# Patient Record
Sex: MALE | Race: WHITE | NOT HISPANIC OR LATINO | Employment: OTHER
[De-identification: names, ages, dates, MRNs, and addresses within clinical notes are randomized per-mention and may not be internally consistent; named-entity substitution may affect disease eponyms.]

---

## 2023-01-01 ENCOUNTER — TRANSCRIBE ORDERS (OUTPATIENT)
Dept: OTHER | Age: 63
End: 2023-01-01

## 2023-01-01 ENCOUNTER — HEALTH MAINTENANCE LETTER (OUTPATIENT)
Age: 63
End: 2023-01-01

## 2023-01-01 ENCOUNTER — PATIENT OUTREACH (OUTPATIENT)
Dept: ONCOLOGY | Facility: CLINIC | Age: 63
End: 2023-01-01
Payer: COMMERCIAL

## 2023-01-01 ENCOUNTER — LAB REQUISITION (OUTPATIENT)
Dept: LAB | Facility: CLINIC | Age: 63
End: 2023-01-01
Payer: COMMERCIAL

## 2023-01-01 ENCOUNTER — PRE VISIT (OUTPATIENT)
Dept: ONCOLOGY | Facility: CLINIC | Age: 63
End: 2023-01-01

## 2023-01-01 ENCOUNTER — ONCOLOGY VISIT (OUTPATIENT)
Dept: ONCOLOGY | Facility: CLINIC | Age: 63
End: 2023-01-01
Attending: INTERNAL MEDICINE
Payer: COMMERCIAL

## 2023-01-01 VITALS
HEIGHT: 72 IN | BODY MASS INDEX: 34.65 KG/M2 | RESPIRATION RATE: 16 BRPM | OXYGEN SATURATION: 100 % | HEART RATE: 64 BPM | TEMPERATURE: 97.8 F | SYSTOLIC BLOOD PRESSURE: 110 MMHG | WEIGHT: 255.8 LBS | DIASTOLIC BLOOD PRESSURE: 70 MMHG

## 2023-01-01 DIAGNOSIS — C61 PROSTATE CANCER (H): Primary | ICD-10-CM

## 2023-01-01 DIAGNOSIS — C61 PROSTATE CANCER (H): ICD-10-CM

## 2023-01-01 LAB
PATH REPORT.COMMENTS IMP SPEC: ABNORMAL
PATH REPORT.COMMENTS IMP SPEC: YES
PATH REPORT.COMMENTS IMP SPEC: YES
PATH REPORT.FINAL DX SPEC: ABNORMAL
PATH REPORT.FINAL DX SPEC: ABNORMAL
PATH REPORT.GROSS SPEC: ABNORMAL
PATH REPORT.GROSS SPEC: ABNORMAL
PATH REPORT.MICROSCOPIC SPEC OTHER STN: ABNORMAL
PATH REPORT.RELEVANT HX SPEC: ABNORMAL
PATH REPORT.SITE OF ORIGIN SPEC: ABNORMAL
PATH REPORT.SITE OF ORIGIN SPEC: ABNORMAL

## 2023-01-01 PROCEDURE — 88321 CONSLTJ&REPRT SLD PREP ELSWR: CPT | Performed by: PATHOLOGY

## 2023-01-01 PROCEDURE — 99213 OFFICE O/P EST LOW 20 MIN: CPT | Performed by: STUDENT IN AN ORGANIZED HEALTH CARE EDUCATION/TRAINING PROGRAM

## 2023-01-01 PROCEDURE — 99417 PROLNG OP E/M EACH 15 MIN: CPT | Performed by: STUDENT IN AN ORGANIZED HEALTH CARE EDUCATION/TRAINING PROGRAM

## 2023-01-01 PROCEDURE — 99205 OFFICE O/P NEW HI 60 MIN: CPT | Performed by: STUDENT IN AN ORGANIZED HEALTH CARE EDUCATION/TRAINING PROGRAM

## 2023-01-01 RX ORDER — OXYCODONE HYDROCHLORIDE 5 MG/1
1 TABLET ORAL EVERY 4 HOURS PRN
COMMUNITY
Start: 2023-01-01

## 2023-01-01 RX ORDER — PREGABALIN 200 MG/1
200 CAPSULE ORAL
COMMUNITY
Start: 2021-01-20

## 2023-01-01 RX ORDER — ACETAMINOPHEN 325 MG/1
650 TABLET ORAL
COMMUNITY
Start: 2022-07-08

## 2023-01-01 RX ORDER — SENNOSIDES A AND B 8.6 MG/1
TABLET, FILM COATED ORAL DAILY PRN
COMMUNITY
Start: 2020-01-20

## 2023-01-01 RX ORDER — HYDROCODONE BITARTRATE AND ACETAMINOPHEN 5; 325 MG/1; MG/1
1-2 TABLET ORAL
COMMUNITY
Start: 2021-12-13

## 2023-01-01 RX ORDER — PRUCALOPRIDE 1 MG/1
2 TABLET, FILM COATED ORAL
COMMUNITY
Start: 2022-01-20

## 2023-01-01 RX ORDER — LANOLIN ALCOHOL/MO/W.PET/CERES
1000 CREAM (GRAM) TOPICAL DAILY
COMMUNITY

## 2023-01-01 ASSESSMENT — PAIN SCALES - GENERAL: PAINLEVEL: NO PAIN (0)

## 2023-11-20 NOTE — PROGRESS NOTES
New Patient Oncology Nurse Navigator Note     Referring provider:   Referred by Tish Jones Daniel S, M.D, requesting Sasha     Referred to (specialty): Medical Oncology    Requested provider (if applicable):   Dr. Baez     Date Referral Received:   11/20/23     Evaluation for :   Prostate cancer  [id number] at CD3-PSMA BITE  ELISABETH 280 cd3/B7H3    Clinical History (per Nurse review of records provided):    Per Dr. Winston note:  Metastatic castration resistant prostate cancer  Mr. Prasad is a 63-year-old with bone-predominant, metastatic castration resistant prostate cancer. Regarding systemic therapies, he has received abiraterone/prednisone, sipuleucel-T, enzalutamide, [id number] (TIGIT receptor antibody) plus pembrolizumab, FOR46 (MMAE ADC to CD46), radium-223, cabazitaxel (chosen over docetaxel given CIPN and desire to minimize hair loss), Pluvicto (6 cycles completed in April 2023), and PT-112-101 (phosplatin clinical trial).         Records Location (Care Everywhere, Media, etc.):   [place]      Dr. Winston had reached out to Dr. Baez via email about possible trial options.  Dr. Baez is willing to add on to see this patient for the following dates/time.  11/27 @ 830AM    I called in attempt to reach patient to discuss.  There was no answer so I LM for him to return my call.    11/21/23  I had not heard back from Paul, so I called again to discuss.  I introduced my role and reviewed what this consult visit will entail/what to expect.  I reviewed the location and gave contact numbers including new patient scheduling and clinic phone numbers.   Paul, has no other questions at this time.    I forwarded on referral with scheduling instructions for the following     PLAN:   1) Dr. Baez @ [facility],11/27 830- 930 AM, NEW, in person  2) if patient is scheduled with Dr. Neno Baez @ [facility], then also schedule for a lab draw following the consultation    I warm transferred call to our new patient scheduling to finalize  appointment.    Zita Quach, RN, BSN  Oncology New Patient Nurse Navigator   [facility]  [phone]

## 2023-11-22 NOTE — TELEPHONE ENCOUNTER
Action 2023 8:37 AM ABT   Action Taken Called [facility] james for them to push images to PACS     RECORDS STATUS - ALL OTHER DIAGNOSIS      RECORDS RECEIVED FROM: [facility]   DATE RECEIVED:    NOTES STATUS DETAILS   OFFICE NOTE from referring provider [facility] 23: Dr. Doc Winston   OFFICE NOTE from medical oncologist [facility]:  23: Dr. Doc Winston    [facility]:   OFFICE NOTE from other specialist [facility] Rad Onc:  10/20/23: Dr. Yolis Radford   DISCHARGE SUMMARY from hospital [facility] 16: [facility]   OPERATIVE REPORT [facility] 17: ENDO ADULT COLONOSCOPY WITH BIOPSY     16: Robotic prostatectomy with lymph node dissection    MEDICATION LIST [facility]    LABS     PATHOLOGY REPORTS Re [facility]    [facility] [employer] [facility]  [facility] [facility]:  23: [id number]    [facility]:  11/25/15: [id number]   ANYTHING RELATED TO DIAGNOSIS [facility] Most recent 11/15/23    PSA: 11/15/23   GENONOMIC TESTING     TYPE: External: Guardant 360 06/10/23    IMAGING (NEED IMAGES & REPORT)  [facility] [employer] [facility]   CT SCANS Re [facility]:  23-22: CT Chest  23-22: CT Abd Pel    [facility]:  22-12/07/15: CT Abd Pel  22-17: CT Chest   MRI Req [facility]:  22-22: MR T-Spine    [facility]:  22-21: MR T-Spine  21, 20: MR Pelvis  16: MR Prostate   XRAYS/NM Req [facility]:  23-22: NM Bone Scan    [facility]:  22-17: NM Whole Body  10/14/21: XR Whole Body  16, 16: XR Cystogram   ULTRASOUND Req -[facility]:  040/, 12/23/15: US Transrectal   PET Req -[facility]:  23, 22: PET CT Skull PSMA    [facility]:  21, 20: PET PSMA  19-18: PET CT Axium

## 2023-11-25 NOTE — PROGRESS NOTES
[facility] Medical Oncology Second Opinion Consultation Note       Date of visit: November 27, 2023    Dear Dr. Doc Winston, thank you for referring your patient for a Second Opinion consultation at the [facility].  A brief overview of his oncological history as well as my recommendations follow below.    CC: Clinical trial availability for [id number] for mCPRC.      HPI: Feeling OK today.  Appetite is good.  Not significantly limited at this time in activity.  Fatigued, but able to do what he wants to do.  No nausea at this time.  Walks with cane.  Interested in clinical trials here at [facility].  Also seeing team in [place] for possible RLT trials.      ONCOLOGY HISTORY (Adapted from Dr. Winston' history at [facility]):    Primary Malignant Neoplasm Of Prostate (HCC)  2015 Initial Diagnosis  Mr. Prasad was diagnosed with prostate cancer in 2015. He presented with urinary obstruction, PSA 43, Ralph 4+5 prostate cancer on TRUS biopsy.    11/2015: Bone scan + CT A/P with non-specific uptake of L 11th rib, no definitive evidence of metastatic disease.    1/14/2016 Surgery and Procedures  Radical prostatectomy, pathology pT4N1 (1/22 lymph nodes positive), Ralph 4+3 (tertiary 5), positive margins.    Post prostatectomy PSA (2/22/2016): PSA 0.617    5/12/2016 - Radiation Therapy  3/23/2016: Combidex scan with up to 32 suspicious lymph nodes up to 1 cm below renal artery; no evidence of suspicious bone lesions.    3/28/2016: Start ADT    5/12/16: Salvage radiation starts (details unavailable)    12/2016: PSA < 0.015    1/2018 - Chemotherapy (presume docetaxel)  1/9/2018: PSA 2.173. Auxumin PET with uptake L 10th rib, T8.    1/29/2018 - 2/26/2018: Sipuleucel-T (Provenge) x 3 doses    1/30/2018: Cancer Risk Genetics - germline panel with VUS in APC, no other mutations identified    3/2018 - Radiation Therapy  3/2018: SBRT to T8 and L 10th rib    9/2018 - 6/10/2019  Chemotherapy  8/24/2018: PSA 8.8    8/29/2018: Axumin PET with new uptake T5, increased size and tracer uptake at L lateral 10th rib    9/10/2018: Started abiraterone/prednisone    10/11-10/12/18: SBRT to T5 lesion    4/16/2019: PSA 3.679. Axumin-PET/CT showed no evidence of progression.    6/7/2019: PSA 7.289    6/10/2019: Discontinued abiraterone, tapered prednisone (appears to have been transitioned to enzalutamide in the setting of biochemical progression)    7/2/2019 - 12/10/2019 Chemotherapy  7/2/2019: Started enzalutamide    11/25/2019: CT CAP, stable osseous lesions in the spine and 10th left rib. Increase in size of metastases within the posterior lateral left 10th rib and partially visualized sclerotic lesion within the T8 vertebral body.    11/26/2019: NM bone scan, stable osseous metastatic disease within the posterolateral left 10th rib, T5 and T8 vertebral bodies.    12/9/2019: PSA 4.313    12/10/2019: Stop enzalutamide for radiographic progression.    1/16/2020 - 5/14/2020 Chemotherapy  1/16/2020: [id number] + Pembrolizumab    6 cycles completed, osseous and biochemical progression    6/10/2020 - 8/30/2021 Chemotherapy  6/10/20: A Phase 1 Study of FOR46 (vc-MMAE antibody-drug conjugate that recognizes a tumor-selective epitope of Cluster of differentiation 46 (Cd46))    After 21 cycles, discontinued for radiographic progression in the thoracic spine (epidural extension) and L sacrum.    Course was complicated by CIPN.    9/23/2021 - Chemotherapy  9/23/21: Start treatment with Xofigo (radium 223). Completed 6th and final treatment on 2/10/2022.    11/8/2021 - Radiation Therapy  11/8/2021: SBRT T8, T9, T12, L 10th rib (unable to treat T5 due to proximity to spinal cord)    3/21/2022 Progression/Relapse  Progression of osseous metastases.  PSA 16.8    4/14/2022 - 6/21/2022 Chemotherapy  Start cabazitaxel, which was selected over docetaxel given the patients neuropathy and desire to minimize hair  loss.    Completed 4 cycles of treatment.    PSA increased to 32.5    Progressive skeletal metastases noted 7/22/2022. His miPSMA score is 3. In addition, MR showed progressive disease throughout the thoracic spine.    He was referred for palliative RT (T10, T4, T5) prior to planned initiation of Pluvicto, which was temporarily unavailable owing to supply chain and production issues.    Cabazitaxel  Start Date: 4/14/2022 8/11/2022 - 8/19/2022 Radiation Therapy  Radiation Therapy Treatment Details (8/17/2022 - 8/19/2022)  Site: Spine - Thoracic  Technique: SBRT  Goal: Palliative  Planned Treatment Start Date: 8/11/2022 9/9/2022 Other  Radioligand Therapy  Pluvicto:  Cycle 1: 9/09/2022  Cycle 2: 10/21/2022  Cycle 3: 12/01/2022  Cycle 4: 1/20/2023  Cycle 5: 3/07/2023  Cycle 6: 4/18/2023    PSA at the start of treatment was 25.4 ng/mL. His dalia was 1.4 ng/mL prior to cycle 5. By the end of treatment, PSA had increased to 3.9 ng/mL. Posttreatment scans demonstrate no clear evidence of visceral or mao metastases. Bone scan demonstrates uptake in multiple new right posterior ribs (in the absence of trauma, though CT demonstrates rib fractures) and increasing radiotracer activity in the thoracic spine.      7/26/2023 - Research Study Participant  Research Study: A Phase 1 Study Evaluating the Safety, PK, and Clinical Effects of PT-112 in Subjects With Advanced Solid Tumors ([id number])  Treatment Protocol: [facility] PT-112-101 Dose Expansion Cohort D Part 2 Arm 2 ( PT-112 ).  (Ribosomal biogenesis inhibitor, small molecule from [employer]).      9/20/2023 Critical Imaging   Bone scan demonstrates slight increase in uptake sacral metastasis with no new sites of metastatic disease. CT chest, abdomen, and pelvis were stable.    10/18/2023 - 10/20/2023 Radiation Therapy   Proceeded with palliative radiotherapy to left rib and sacrum. Continued clinical trial.    Radiation Therapy Treatment Details (10/18/2023 -  10/20/2023)  Sites: Left Rib, Sacrum  Technique: SBRT  Goal: Palliative  Planned Treatment Start Date: 10/18/2023    11/15/2023 Other   PSA rises to 64.5 ng/mL  Alkaline phosphatase 108    Restaging scans (conventional imaging) demonstrate progression in the liver with at least 8 new metastases, including multiple lesions over a cm in size. He has stable disease in the bones.     11/27/23-Clinical trial second opinion at [facility]. Provided with ELISABETH-280 and [id number] consents to review.  No slots until at least the end of January most likely.  I have also reached out to Dr. Kashmir Dickson at [facility] regarding availability for AMG-509.      GENOMIC STUDIES:   [facility]C VUS germline panel noted in 2018.    Guardant 360 6/8/23-      TREATMENT HISTORY:   RP  Salvage RT  Docetaxel (CSPC and CRPC)  Enzalutamide   Abiraterone  Sip-T  Cabazitaxel   FOR46 (vc-MMAE antibody-drug conjugate that recognizes a tumor-selective epitope of Cluster of differentiation 46 (Cd46)) (Phase 1)  [facility] PT-112-101 Dose Expansion Cohort D Part 2 Arm 2 ( PT-112 ).  (Ribosomal biogenesis inhibitor, small molecule from [employer]).    SBRT to multiple sites   [id number] + Pembrolizumab  Pluvicto x6    GUIDELINES USED:  NCCN (clinical trial)    INTENT OF THERAPY: Palliative, discussed at 11/27/23 visit.      CLINICAL TRIALS:  Amelie CD3-PSMA, ELISABETH-280    PMH:  mCRPC  Supraventricular tachycardia   Chronic anticoagulation   Neuropathy secondary to chemotherapy  Pulmonary embolism history (dx 8/2021)     PSH:  RP     FAMILY HX:  Mother, colon cancer, breast cancer, ovarian cancer   Brother, testicular cancer     SOCIAL:  Never smoker  Never smokeless tobacco user   Rare EtOH  No recreational drugs.     ALLERGIES: Bee venom    MEDS:  Lupron  Oxycodone  Senna  Pregabalin  Metoprolol (toprol XL).    Eliquus 2.5mg BID (ppx dose) for hx of PE in 2021  Prucalopride (motegrity) for chronic constipation     ROS-Remainder of 14 point ROS reviewed and  "negative except as in HPI.    PHYSICAL EXAM:  ECOG-PS=1    /70 (BP Location: Right arm, Patient Position: Sitting, Cuff Size: Adult Large)   Pulse 64   Temp 97.8  F (36.6  C) (Oral)   Resp 16   Ht 1.82 m (5' 11.65\")   Wt 116 kg (255 lb 12.8 oz)   SpO2 100%   BMI 35.03 kg/m    Exam:  Constitutional: healthy, alert, and no distress  Head: Normocephalic. No masses, lesions, tenderness or abnormalities, anicteric sclerae.   Neck: Neck supple. No adenopathy. Thyroid symmetric, normal size,  ENT: ENT exam normal, no neck nodes or sinus tenderness..   Cardiovascular: negative, No edema or JVD.  Respiratory: negative, normal WOB on RA, no wheezing or rhonchi audible.   Gastrointestinal: negative, non-distended, non-tender.   : Deferred  Musculoskeletal: extremities normal- no gross deformities noted and normal muscle tone, walking with cane.   Skin: no suspicious lesions or rashes on exposed areas of skin.   Neurologic: walks with cane. CNII-XII grossly intact, normal speech and mentation.   Psychiatric: mentation appears normal and affect normal/bright  Hematologic/Lymphatic/Immunologic: Normal cervical lymph nodes    LABS AND IMAGES:    Labs from [facility] on 11/15/23 reviewed.    WBC 5.7, Hgb 9.4, plt 90  Creatinine 1.00 GFR 85.      PSA trend-see oncology history.      PSMA PET scan from [facility] 11/16/23  Mixed response to Pluvicto with some areas of response and other areas of progression.  Multiple liver lesions present.      IMPRESSION AND PLAN:    # metastatic, castration-resistant prostate cancer.      Paul Prasad is a 63-year-old man with an extensive history of therapy for metastatic castration resistant prostate cancer.  He was initially diagnosed in 2015 with [place] 4+5 = 9 adenocarcinoma that was thought to be localized.  He underwent radical prostatectomy and subsequent salvage radiation therapy.  Over the course of the last several years he has received multiple lines of therapy including " docetaxel, cabazitaxel, enzalutamide, abiraterone, Sipuleucel-T, radium-223, Pluvicto, and 2 phase 1 clinical trial regimens.  He has had multiple treatments with SBRT as well for metastatic sites.  At our initial appointment on 11/27/2023 Paul and I discussed the basics of immune engager therapy with the Amelie PSMA-CD3 and GHA776 trials.  We discussed these are phase 1 trials with a goal of determining safety and maximum tolerated dose.  At this moment we are in the dose escalation phase and there are 1-2 step up doses required for each of these compounds.  These required between 24 and 48 hours of hospitalization to monitor for cytokine release syndrome and neurotoxicity.  Both medications are given as subcutaneous injection once a week during step up and then either every 3 weeks for Mesa or weekly 4 to IVW908 once the top dose is achieved and the step up.  We talked about major risks with this type of therapy including cytokine release syndrome, neurotoxicity, neuropathy, endorgan damage which could be fatal and severe injury or death.  I did note that Jaz is a little bit further in the step up and more patients have been treated worldwide.  He would be one of the first and [place] to be treated with RUZ951.  He is also seeking clinical trial eligibility with radioligand therapy in [place] and possibly being referred to [facility] in [place] for  as well.  I have separately reached out to Kashmir Dickson and his team at [facility] regarding  slots there.    Unfortunately we do not have many slight openings at the moment and her next slot is not until January.  His PSA has been rising and I agree with proceeding with the first trial that has a treatment availability for him.  I discussed that we would add him to the list for both Mesa and XRS348 clinical trials and we would call if slot is available for him.  I did also discuss that his labs are very close to the edge but he still does  qualify.  The biggest issue we have had with these trials is a hemoglobin of 9.0 or higher in these heavily pretreated populations.  He is scheduled to be evaluated in [place] for this radioligand therapy on Wednesday 11/29/2023.  His performance status is otherwise adequate at ECOG 1 to be eligible for these trials.  We will follow-up as needed if there is Trasylol that is available and he has not already enrolled on another trial.  I did provide him with copies of both clinical trial consents for him to review in the meantime.    PLAN:  We have added to the list for possible candidates for [id number] and [id number].    Consents for ROG630 and [id number] provided as printouts.   I have reached out to Dr. Kashmir Dickson at [place] regarding GDB279 slot availability.   Return with me if we have a slot opening and he has not found another trial in the interval.      A total of 80 minutes were spent on this patient on the day of the encounter, of which more than 50% of this time was used for counseling and coordination of care.  The patient was given the opportunity to ask multiple questions today, all of which were answered to their satisfaction.    Neno Baez MD, PhD   of Medicine   Oncology/BMT/Cellular Therapies

## 2023-11-27 NOTE — NURSING NOTE
"Oncology Rooming Note    November 27, 2023 8:30 AM   Paul Prasad is a 63 year old male who presents for:    Chief Complaint   Patient presents with    Oncology Clinic Visit     Prostate cancer     Initial Vitals: /70 (BP Location: Right arm, Patient Position: Sitting, Cuff Size: Adult Large)   Pulse 64   Temp 97.8  F (36.6  C) (Oral)   Resp 16   Ht 1.82 m (5' 11.65\")   Wt 116 kg (255 lb 12.8 oz)   SpO2 100%   BMI 35.03 kg/m   Estimated body mass index is 35.03 kg/m  as calculated from the following:    Height as of this encounter: 1.82 m (5' 11.65\").    Weight as of this encounter: 116 kg (255 lb 12.8 oz). Body surface area is 2.42 meters squared.  No Pain (0) Comment: Data Unavailable   No LMP for male patient.  Allergies reviewed: Yes  Medications reviewed: Yes    Medications: Medication refills not needed today.  Pharmacy name entered into [employer]: [id number] - [place] - [address] AT [facility] [address] [address]    Clinical concerns: none       Deepika Castaneda              "

## 2023-11-30 NOTE — TELEPHONE ENCOUNTER
Action 11/30/23@1054 CDK   Action Taken SLIDES FROM [facility] CASE: 01/14/23: [id number]  12/09/15: [id number] TAKEN TO 5TH FLOOR TO BE PROCESSED  CK

## 2023-11-30 NOTE — TELEPHONE ENCOUNTER
Action November 30, 2023 12:28 PM SY   Action Taken Slides from [facility] received and taken to 5th floor path lab for review.  11/25/15: [id number] (6 slides)

## 2024-01-01 ENCOUNTER — DOCUMENTATION ONLY (OUTPATIENT)
Dept: ONCOLOGY | Facility: CLINIC | Age: 64
End: 2024-01-01
Payer: COMMERCIAL

## 2024-01-01 ENCOUNTER — TELEPHONE (OUTPATIENT)
Dept: ONCOLOGY | Facility: CLINIC | Age: 64
End: 2024-01-01
Payer: COMMERCIAL

## 2024-01-10 NOTE — NURSING NOTE
Emailed patient the consent form for 2022IS109. Instructed patient to only review the form and not to sign it. Will follow up with patient on Friday to discuss trial and answer any questions he has. Unclear if a slot is available at this time, waiting for the sponsor to get back to us.     Jenny Robin RN   Clinical Research Coordinator Nurse-Solid Tumor   Phone: [phone] Pgr: [phone]

## 2024-01-12 NOTE — TELEPHONE ENCOUNTER
Called patient to let him know that unfortunately, he is not eligible for the JNJ T Cell trial due to his past epidural extension of his cancer in his spine. Patient understanding and is planning on starting chemotherapy with Dr. Winston next week. Dr. Winston also aware.     Jenny Robin, RN   Clinical Research Coordinator Nurse-Solid Tumor   Phone: [phone] Pgr: [phone]